# Patient Record
Sex: MALE | URBAN - METROPOLITAN AREA
[De-identification: names, ages, dates, MRNs, and addresses within clinical notes are randomized per-mention and may not be internally consistent; named-entity substitution may affect disease eponyms.]

---

## 2022-04-11 ENCOUNTER — APPOINTMENT (RX ONLY)
Dept: URBAN - METROPOLITAN AREA CLINIC 164 | Facility: CLINIC | Age: 62
Setting detail: DERMATOLOGY
End: 2022-04-11

## 2022-04-11 DIAGNOSIS — B07.8 OTHER VIRAL WARTS: ICD-10-CM

## 2022-04-11 DIAGNOSIS — B35.1 TINEA UNGUIUM: ICD-10-CM

## 2022-04-11 DIAGNOSIS — L60.8 OTHER NAIL DISORDERS: ICD-10-CM

## 2022-04-11 PROCEDURE — ? COUNSELING

## 2022-04-11 PROCEDURE — ? ADDITIONAL NOTES

## 2022-04-11 PROCEDURE — 99203 OFFICE O/P NEW LOW 30 MIN: CPT | Mod: 25

## 2022-04-11 PROCEDURE — 17110 DESTRUCTION B9 LES UP TO 14: CPT | Mod: 59

## 2022-04-11 PROCEDURE — ? LIQUID NITROGEN

## 2022-04-11 PROCEDURE — ? PRESCRIPTION

## 2022-04-11 PROCEDURE — 11055 PARING/CUTG B9 HYPRKER LES 1: CPT

## 2022-04-11 PROCEDURE — ? PARING HYPERKERATOTIC LESION

## 2022-04-11 RX ORDER — CICLOPIROX 80 MG/ML
SOLUTION TOPICAL
Qty: 6.6 | Refills: 2 | Status: ERX | COMMUNITY
Start: 2022-04-11

## 2022-04-11 RX ADMIN — CICLOPIROX: 80 SOLUTION TOPICAL at 00:00

## 2022-04-11 ASSESSMENT — LOCATION ZONE DERM
LOCATION ZONE: HAND
LOCATION ZONE: LEG
LOCATION ZONE: TOENAIL

## 2022-04-11 ASSESSMENT — LOCATION SIMPLE DESCRIPTION DERM
LOCATION SIMPLE: LEFT PRETIBIAL REGION
LOCATION SIMPLE: LEFT GREAT TOE
LOCATION SIMPLE: RIGHT HAND

## 2022-04-11 ASSESSMENT — LOCATION DETAILED DESCRIPTION DERM
LOCATION DETAILED: LEFT GREAT TOENAIL
LOCATION DETAILED: RIGHT RADIAL PALM
LOCATION DETAILED: LEFT DISTAL PRETIBIAL REGION

## 2022-04-11 NOTE — PROCEDURE: LIQUID NITROGEN
Duration Of Freeze Thaw-Cycle (Seconds): 3
Add 52 Modifier (Optional): no
Number Of Freeze-Thaw Cycles: 1 freeze-thaw cycle
Show Topical Anesthesia Variable?: Yes
Detail Level: Detailed
Consent: The patient's consent was obtained including but not limited to risks of crusting, scabbing, blistering, scarring, darker or lighter pigmentary change, recurrence, incomplete removal and infection.
Post-Care Instructions: I reviewed with the patient in detail post-care instructions. Patient is to wear sunprotection, and avoid picking at any of the treated lesions. Pt may apply Vaseline to crusted or scabbing areas.
Spray Paint Text: The liquid nitrogen was applied to the skin utilizing a spray paint frosting technique.
Medical Necessity Clause: This procedure was medically necessary because the lesions that were treated were:
Medical Necessity Information: It is in your best interest to select a reason for this procedure from the list below. All of these items fulfill various CMS LCD requirements except the new and changing color options.

## 2022-04-25 ENCOUNTER — APPOINTMENT (RX ONLY)
Dept: URBAN - METROPOLITAN AREA CLINIC 164 | Facility: CLINIC | Age: 62
Setting detail: DERMATOLOGY
End: 2022-04-25

## 2022-04-25 DIAGNOSIS — B07.8 OTHER VIRAL WARTS: ICD-10-CM | Status: RESOLVING

## 2022-04-25 DIAGNOSIS — L81.0 POSTINFLAMMATORY HYPERPIGMENTATION: ICD-10-CM

## 2022-04-25 PROCEDURE — 17110 DESTRUCTION B9 LES UP TO 14: CPT

## 2022-04-25 PROCEDURE — ? LIQUID NITROGEN

## 2022-04-25 PROCEDURE — ? COUNSELING

## 2022-04-25 PROCEDURE — ? ADDITIONAL NOTES

## 2022-04-25 PROCEDURE — 99212 OFFICE O/P EST SF 10 MIN: CPT | Mod: 25

## 2022-04-25 ASSESSMENT — LOCATION DETAILED DESCRIPTION DERM: LOCATION DETAILED: RIGHT RADIAL PALM

## 2022-04-25 ASSESSMENT — LOCATION ZONE DERM: LOCATION ZONE: HAND

## 2022-04-25 ASSESSMENT — LOCATION SIMPLE DESCRIPTION DERM: LOCATION SIMPLE: RIGHT HAND

## 2022-05-24 ENCOUNTER — APPOINTMENT (RX ONLY)
Dept: URBAN - METROPOLITAN AREA CLINIC 164 | Facility: CLINIC | Age: 62
Setting detail: DERMATOLOGY
End: 2022-05-24

## 2022-05-24 DIAGNOSIS — B07.8 OTHER VIRAL WARTS: ICD-10-CM

## 2022-05-24 PROCEDURE — ? COUNSELING

## 2022-05-24 PROCEDURE — 11055 PARING/CUTG B9 HYPRKER LES 1: CPT

## 2022-05-24 PROCEDURE — ? PARING HYPERKERATOTIC LESION

## 2022-05-24 PROCEDURE — ? LIQUID NITROGEN

## 2022-05-24 PROCEDURE — ? ADDITIONAL NOTES

## 2022-05-24 PROCEDURE — 99212 OFFICE O/P EST SF 10 MIN: CPT | Mod: 25

## 2022-05-24 PROCEDURE — 17110 DESTRUCTION B9 LES UP TO 14: CPT | Mod: 59

## 2022-05-24 ASSESSMENT — LOCATION SIMPLE DESCRIPTION DERM
LOCATION SIMPLE: LEFT PRETIBIAL REGION
LOCATION SIMPLE: RIGHT HAND

## 2022-05-24 ASSESSMENT — LOCATION DETAILED DESCRIPTION DERM
LOCATION DETAILED: RIGHT RADIAL PALM
LOCATION DETAILED: LEFT DISTAL PRETIBIAL REGION

## 2022-05-24 ASSESSMENT — LOCATION ZONE DERM
LOCATION ZONE: LEG
LOCATION ZONE: HAND

## 2022-05-24 NOTE — PROCEDURE: LIQUID NITROGEN
Show Topical Anesthesia Variable?: Yes
Medical Necessity Clause: This procedure was medically necessary because the lesions that were treated were:
Spray Paint Technique: No
Duration Of Freeze Thaw-Cycle (Seconds): 3
Post-Care Instructions: I reviewed with the patient in detail post-care instructions. Patient is to wear sunprotection, and avoid picking at any of the treated lesions. Pt may apply Vaseline to crusted or scabbing areas.
Medical Necessity Information: It is in your best interest to select a reason for this procedure from the list below. All of these items fulfill various CMS LCD requirements except the new and changing color options.
Number Of Freeze-Thaw Cycles: 1 freeze-thaw cycle
Detail Level: Detailed
Consent: The patient's consent was obtained including but not limited to risks of crusting, scabbing, blistering, scarring, darker or lighter pigmentary change, recurrence, incomplete removal and infection.
Spray Paint Text: The liquid nitrogen was applied to the skin utilizing a spray paint frosting technique.

## 2022-06-14 ENCOUNTER — APPOINTMENT (RX ONLY)
Dept: URBAN - METROPOLITAN AREA CLINIC 164 | Facility: CLINIC | Age: 62
Setting detail: DERMATOLOGY
End: 2022-06-14

## 2022-06-14 DIAGNOSIS — B07.8 OTHER VIRAL WARTS: ICD-10-CM | Status: IMPROVED

## 2022-06-14 PROCEDURE — ? PRESCRIPTION

## 2022-06-14 PROCEDURE — ? ADDITIONAL NOTES

## 2022-06-14 PROCEDURE — ? LIQUID NITROGEN

## 2022-06-14 PROCEDURE — ? COUNSELING

## 2022-06-14 PROCEDURE — 17110 DESTRUCTION B9 LES UP TO 14: CPT

## 2022-06-14 RX ORDER — PHARMACY COMPOUNDING ACCESSORY
EACH MISCELLANEOUS QHS
Qty: 30 | Refills: 2 | Status: ERX | COMMUNITY
Start: 2022-06-14

## 2022-06-14 RX ADMIN — Medication: at 00:00

## 2022-06-14 ASSESSMENT — LOCATION SIMPLE DESCRIPTION DERM: LOCATION SIMPLE: RIGHT HAND

## 2022-06-14 ASSESSMENT — LOCATION DETAILED DESCRIPTION DERM: LOCATION DETAILED: RIGHT RADIAL PALM

## 2022-06-14 ASSESSMENT — LOCATION ZONE DERM: LOCATION ZONE: HAND

## 2025-03-04 NOTE — PROCEDURE: LIQUID NITROGEN
Physical Therapy Treatment     Patient Name: Yadi Rocha  MRN: [id number]  Today's Date: 3/4/2025  Time Calculation  Start Time: 0915  Stop Time: 1005  Time Calculation (min): 50 min       PT Therapeutic Procedures Time Entry  Manual Therapy Time Entry: 15  Therapeutic Exercise Time Entry: 30    INSURANCE:  Visit Number: 4  Insurance Type: Payor: [employer] / Plan: [employer] TRADITIONAL / Product Type: *No Product type* /   CMS Re-Certification Period:2/17/25 to 5/18/25    Current Problem  1. Incomplete tear of left rotator cuff, unspecified whether traumatic  Follow Up In Physical Therapy          PRECAUTIONS:  It is okay for the patient to shower but avoid soaking the wounds (no pools or bathtubs) for 3-4 more weeks until the wounds are completely healed. The patient can slowly increase their activity levels, but must do so slowly to avoid aggravating the joint. We gave the patient a prescription for physical therapy to work on range of motion and strengthening of the extremity. We did discuss with the patient that she will not begin any strengthening exercises until she is 3 months postoperative. She should continue to wear her sling until she is 6 weeks postop.      PMH: (pertinent to PT evaluation)     *See Epic EMR for complete list.    Medical History Form: Reviewed (scanned into chart)    PAIN  Start of Treatment: 4/10 at rest in sling, but 7/10 with any movement      SUBJECTIVE  The pt went back to work yesterday.  She is able to do more, including take the sling off without a long period to drop the arm.  Sleep is still disturbed.  She has the bed lower      OBJECTIVE  SHOULDER:           PROM                                                                                             L                       Flexion                          55  degrees                   Abduction                     70  degrees       ER at neutral                 -10  degrees                  TREATMENT:  Manual Therapy:  
"  Supine PROM to L shoulder, elbow, forearm, and wrist    Therapeutic Exercise:    *Pendulum exercise - DNP    *Standing with UE on table and weight shifting     Lateral   Forward and back    *Table glide in standing -    Flexion   Circles    *Ball rolling (small) on table   CW, CCW, forward and back    UBE   Rocking 2 minutes    Pulleys   Small ROM  2 minutes     Home exercise program: (HEP)  Issued \"**\" exercise as new HEP with written copy issued.   \"*\" exercises are current HEP    Education/instruction:  New:    progress    Prior:  Pt educated in:  + current status, general goals, treatment plan  + use of body on arm for hygiene  + Expectations for therapy  + Benefits of self active assistive and passive range of motion  + Added ball rolling to HEP  +Progress  +Weaning from sling  +Able to remove sling with arm supported, especially if fingers are getting  +Realistic Progression    Home Exercise program:  See \"*\" exercises above    PAIN  End of session pain ratin/10 IN SLING    ASSESSMENT:  The pt was able to progress ROM in all planes.  She has less pain with moving out of the sling and with exercise, both AAROM and PROM.  Able to progress to pulleys.      Compliance with HEP:     good  Assessment of current progress against goals:    progressing      GOALS:  Pt stated goal:  Full use of the left upper extremity     Short term goals:  + Increase left shoulder ROM by 20 degrees  + Reduce pain by 2 points with movement  + Independent in a basic HEP per protocol  + Able to sleep at least 6 hours a night     Long term goals:  + reduce pain to <3/10  + Full AROM in left shoulder as needed for reaching over head  + 5/5 strength in left shoulder and scapular stabilizers for stability during functional reach and lifting  + Improved posture and independent postural correction for reduced pain and proper biomechanics for optimal shoulder function  + Independent in a HEP for self-management of symptoms.  + Reduce "
Show Topical Anesthesia Variable?: Yes
Medical Necessity Clause: This procedure was medically necessary because the lesions that were treated were:
soft tissue restrictions for normal biomechanics and function  + Improve function with reduced reported disability by 10% on ASES  + Able to bathe and dress using the left UE without increased pain  + Able to lift and reach without increased pain or compensation as needed for home and work     PLAN  Consider EMS  Consider rocking on UBE    Skilled physical therapy 2 times per week for 12 weeks  Treatment may include:  Therapeutic Exercise (11881)  Therapeutic Activity (30715)  Manual therapy (66159)  Neuro-muscular re-education (04572)  Ultrasound (27535)  Unattended Electrical Stimulation (/94888)  Attended Electrical Stimulation (29164)  Light therapy (74633)  
Spray Paint Technique: No
Duration Of Freeze Thaw-Cycle (Seconds): 3
Post-Care Instructions: I reviewed with the patient in detail post-care instructions. Patient is to wear sunprotection, and avoid picking at any of the treated lesions. Pt may apply Vaseline to crusted or scabbing areas.
Medical Necessity Information: It is in your best interest to select a reason for this procedure from the list below. All of these items fulfill various CMS LCD requirements except the new and changing color options.
Number Of Freeze-Thaw Cycles: 1 freeze-thaw cycle
Detail Level: Detailed
Consent: The patient's consent was obtained including but not limited to risks of crusting, scabbing, blistering, scarring, darker or lighter pigmentary change, recurrence, incomplete removal and infection.
Spray Paint Text: The liquid nitrogen was applied to the skin utilizing a spray paint frosting technique.